# Patient Record
Sex: FEMALE | Race: WHITE | NOT HISPANIC OR LATINO | Employment: STUDENT | URBAN - METROPOLITAN AREA
[De-identification: names, ages, dates, MRNs, and addresses within clinical notes are randomized per-mention and may not be internally consistent; named-entity substitution may affect disease eponyms.]

---
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
## 2020-05-16 ENCOUNTER — E-VISIT (OUTPATIENT)
Dept: FAMILY MEDICINE | Facility: CLINIC | Age: 20
End: 2020-05-16
Payer: COMMERCIAL

## 2020-05-16 DIAGNOSIS — B35.4 TINEA CORPORIS: Primary | ICD-10-CM

## 2020-05-16 PROCEDURE — 99421 OL DIG E/M SVC 5-10 MIN: CPT | Performed by: FAMILY MEDICINE

## 2020-05-19 ENCOUNTER — MYC MEDICAL ADVICE (OUTPATIENT)
Dept: FAMILY MEDICINE | Facility: CLINIC | Age: 20
End: 2020-05-19

## 2020-05-19 RX ORDER — ITRACONAZOLE 100 MG/1
200 CAPSULE ORAL DAILY
Qty: 14 CAPSULE | Refills: 0 | Status: SHIPPED | OUTPATIENT
Start: 2020-05-19 | End: 2020-05-26

## 2020-05-19 RX ORDER — TRIAMCINOLONE ACETONIDE 1 MG/G
CREAM TOPICAL 2 TIMES DAILY
Qty: 30 G | Refills: 1 | Status: SHIPPED | OUTPATIENT
Start: 2020-05-19 | End: 2021-08-15

## 2020-12-06 ENCOUNTER — HEALTH MAINTENANCE LETTER (OUTPATIENT)
Age: 20
End: 2020-12-06

## 2021-08-13 ENCOUNTER — MYC MEDICAL ADVICE (OUTPATIENT)
Dept: FAMILY MEDICINE | Facility: CLINIC | Age: 21
End: 2021-08-13

## 2021-08-13 DIAGNOSIS — B35.4 TINEA CORPORIS: ICD-10-CM

## 2021-08-13 NOTE — TELEPHONE ENCOUNTER
Pending Prescriptions:                       Disp   Refills    triamcinolone (KENALOG) 0.1 % external cre*30 g   1        Sig: Apply topically 2 times daily      Routing refill request to provider for review/approval because:  Patient needs to be seen because it has been more than 1 year since last office visit.    Dayana Prieto RN on 8/13/2021 at 1:44 PM

## 2021-08-15 RX ORDER — TRIAMCINOLONE ACETONIDE 1 MG/G
CREAM TOPICAL 2 TIMES DAILY
Qty: 30 G | Refills: 1 | Status: SHIPPED | OUTPATIENT
Start: 2021-08-15 | End: 2022-10-06

## 2021-09-25 ENCOUNTER — HEALTH MAINTENANCE LETTER (OUTPATIENT)
Age: 21
End: 2021-09-25

## 2022-01-15 ENCOUNTER — HEALTH MAINTENANCE LETTER (OUTPATIENT)
Age: 22
End: 2022-01-15

## 2022-04-05 ENCOUNTER — MYC MEDICAL ADVICE (OUTPATIENT)
Dept: FAMILY MEDICINE | Facility: CLINIC | Age: 22
End: 2022-04-05
Payer: COMMERCIAL

## 2022-04-06 NOTE — TELEPHONE ENCOUNTER
Patient is informed to call and ask where she can get her vaccine.  Closing this encounter.  Jackie Ramos, HAWAN, RN

## 2022-04-12 ENCOUNTER — TELEPHONE (OUTPATIENT)
Dept: FAMILY MEDICINE | Facility: CLINIC | Age: 22
End: 2022-04-12
Payer: COMMERCIAL

## 2022-04-12 NOTE — TELEPHONE ENCOUNTER
If pt only needs a tetanus shot, she can schedule with a float nurse. I have attempted to contact pt to schedule an appt. I left a message for her to return my call. Rosario Dave, CMA

## 2022-04-12 NOTE — TELEPHONE ENCOUNTER
Reason for call:  Other   Patient called regarding (reason for call): appointment  Additional comments:   Patient is registering for school. Her registration is on hold until she gets her Updated tetanus. She is available Friday 4/15 or early Monday 4/18. She can see any available provider if possible. Please call to discuss.    Phone number to reach patient:  Other phone number:    [phone] Marycarmen    Best Time:  any    Can we leave a detailed message on this number?  YES    Travel screening: Not Applicable

## 2022-04-15 ENCOUNTER — ALLIED HEALTH/NURSE VISIT (OUTPATIENT)
Dept: FAMILY MEDICINE | Facility: CLINIC | Age: 22
End: 2022-04-15
Payer: COMMERCIAL

## 2022-04-15 DIAGNOSIS — Z23 NEED FOR VACCINATION: Primary | ICD-10-CM

## 2022-04-15 PROCEDURE — 90714 TD VACC NO PRESV 7 YRS+ IM: CPT

## 2022-04-15 PROCEDURE — 99207 PR NO CHARGE NURSE ONLY: CPT

## 2022-04-15 PROCEDURE — 90471 IMMUNIZATION ADMIN: CPT

## 2022-04-15 NOTE — PROGRESS NOTES
Prior to immunization administration, verified patients identity using patient s name and date of birth. Please see Immunization Activity for additional information.     Screening Questionnaire for Adult Immunization    Are you sick today?   No   Do you have allergies to medications, food, a vaccine component or latex?   No   Have you ever had a serious reaction after receiving a vaccination?   No   Do you have a long-term health problem with heart, lung, kidney, or metabolic disease (e.g., diabetes), asthma, a blood disorder, no spleen, complement component deficiency, a cochlear implant, or a spinal fluid leak?  Are you on long-term aspirin therapy?   No   Do you have cancer, leukemia, HIV/AIDS, or any other immune system problem?   No   Do you have a parent, brother, or sister with an immune system problem?   No   In the past 3 months, have you taken medications that affect  your immune system, such as prednisone, other steroids, or anticancer drugs; drugs for the treatment of rheumatoid arthritis, Crohn s disease, or psoriasis; or have you had radiation treatments?   No   Have you had a seizure, or a brain or other nervous system problem?   No   During the past year, have you received a transfusion of blood or blood    products, or been given immune (gamma) globulin or antiviral drug?   No   For women: Are you pregnant or is there a chance you could become       pregnant during the next month?   No   Have you received any vaccinations in the past 4 weeks?   No     Immunization questionnaire answers were all negative.        Per orders of Dr. Valentino, injection of TD given by Karis Watkins CMA. Patient instructed to remain in clinic for 15 minutes afterwards, and to report any adverse reaction to me immediately.       Screening performed by Karis Watkins CMA on 4/15/2022 at 1:26 PM.

## 2022-10-04 ENCOUNTER — MYC MEDICAL ADVICE (OUTPATIENT)
Dept: FAMILY MEDICINE | Facility: CLINIC | Age: 22
End: 2022-10-04

## 2022-10-05 ENCOUNTER — E-VISIT (OUTPATIENT)
Dept: FAMILY MEDICINE | Facility: CLINIC | Age: 22
End: 2022-10-05
Payer: COMMERCIAL

## 2022-10-05 DIAGNOSIS — B35.4 TINEA CORPORIS: ICD-10-CM

## 2022-10-05 PROCEDURE — 99421 OL DIG E/M SVC 5-10 MIN: CPT | Performed by: FAMILY MEDICINE

## 2022-10-06 RX ORDER — PRENATAL VIT 91/IRON/FOLIC/DHA 28-975-200
COMBINATION PACKAGE (EA) ORAL 2 TIMES DAILY
Qty: 30 G | Refills: 1 | Status: SHIPPED | OUTPATIENT
Start: 2022-10-06 | End: 2023-05-19

## 2022-10-06 RX ORDER — TRIAMCINOLONE ACETONIDE 1 MG/G
CREAM TOPICAL 2 TIMES DAILY
Qty: 30 G | Refills: 1 | Status: SHIPPED | OUTPATIENT
Start: 2022-10-06 | End: 2023-05-19

## 2022-10-06 NOTE — PATIENT INSTRUCTIONS
"  Dear Pati Mahmood    After reviewing your responses, I've been able to diagnose you with \"tinea\" which is a common skin condition caused by a fungal infection. There are many common names for this depending on where it is located and it's appearance (jock itch, athlete's foot, ringworm, etc).  Based on your responses, I have prescribed terbinafine and triamcinolone to treat this. Please follow the instructions on the medication. If you experience irritation of your skin, new rash, or any other new symptoms, you should stop using this medication and contact your primary care provider.  If this treatment does not work for you in 2 weeks or after completing treatment, please plan to follow-up with your primary care provider to evaluate in-person.  Self-care:  Wash all items that come into contact with infected skin: Wash all towels, clothes, and bedding in hot water. Use laundry soap. Clean shower stalls, mats, and floors with a germ-killing or fungus-killing .   Do not share personal items: Do not share towels, brushes, pierce, or hair accessories.    Keep your skin, hair, and nails clean and dry: Bathe every day, and dry your skin before you put medicine on the infected area. Wash your hands often. Do not scratch your sores. This may cause the infection to spread.   Avoid infected pets: A patch of missing fur is a sign of infection in a pet. Take your infected pet to a  for treatment.  Contact your primary healthcare provider if:  You have a fever.    Your infection continues to spread after 7 days of treatment.   Your rash is not gone in 2 weeks.   The area around your rash becomes red, warm, tender, swollen, or smells bad.   You have questions or concerns about your condition or care.    Thanks for choosing?us?as your health care partner,    Lainey Valentino MD  "

## 2023-01-07 ENCOUNTER — HEALTH MAINTENANCE LETTER (OUTPATIENT)
Age: 23
End: 2023-01-07

## 2023-04-22 ENCOUNTER — HEALTH MAINTENANCE LETTER (OUTPATIENT)
Age: 23
End: 2023-04-22

## 2023-05-19 ENCOUNTER — OFFICE VISIT (OUTPATIENT)
Dept: FAMILY MEDICINE | Facility: CLINIC | Age: 23
End: 2023-05-19
Payer: COMMERCIAL

## 2023-05-19 VITALS
HEART RATE: 85 BPM | HEIGHT: 67 IN | TEMPERATURE: 97.7 F | SYSTOLIC BLOOD PRESSURE: 106 MMHG | BODY MASS INDEX: 24.09 KG/M2 | WEIGHT: 153.5 LBS | OXYGEN SATURATION: 99 % | DIASTOLIC BLOOD PRESSURE: 70 MMHG | RESPIRATION RATE: 16 BRPM

## 2023-05-19 DIAGNOSIS — Z00.00 ROUTINE PHYSICAL EXAMINATION: Primary | ICD-10-CM

## 2023-05-19 DIAGNOSIS — B35.4 TINEA CORPORIS: ICD-10-CM

## 2023-05-19 DIAGNOSIS — Z12.4 SCREENING FOR MALIGNANT NEOPLASM OF CERVIX: ICD-10-CM

## 2023-05-19 DIAGNOSIS — Z13.6 CARDIOVASCULAR SCREENING; LDL GOAL LESS THAN 160: ICD-10-CM

## 2023-05-19 LAB
C TRACH DNA SPEC QL NAA+PROBE: NEGATIVE
CHOLEST SERPL-MCNC: 149 MG/DL
HDLC SERPL-MCNC: 61 MG/DL
LDLC SERPL CALC-MCNC: 77 MG/DL
N GONORRHOEA DNA SPEC QL NAA+PROBE: NEGATIVE
NONHDLC SERPL-MCNC: 88 MG/DL
TRIGL SERPL-MCNC: 54 MG/DL

## 2023-05-19 PROCEDURE — 36415 COLL VENOUS BLD VENIPUNCTURE: CPT | Performed by: FAMILY MEDICINE

## 2023-05-19 PROCEDURE — 87591 N.GONORRHOEAE DNA AMP PROB: CPT | Performed by: FAMILY MEDICINE

## 2023-05-19 PROCEDURE — 87491 CHLMYD TRACH DNA AMP PROBE: CPT | Performed by: FAMILY MEDICINE

## 2023-05-19 PROCEDURE — G0145 SCR C/V CYTO,THINLAYER,RESCR: HCPCS | Performed by: FAMILY MEDICINE

## 2023-05-19 PROCEDURE — 80061 LIPID PANEL: CPT | Performed by: FAMILY MEDICINE

## 2023-05-19 PROCEDURE — 99395 PREV VISIT EST AGE 18-39: CPT | Performed by: FAMILY MEDICINE

## 2023-05-19 RX ORDER — PRENATAL VIT 91/IRON/FOLIC/DHA 28-975-200
COMBINATION PACKAGE (EA) ORAL 2 TIMES DAILY
Qty: 60 G | Refills: 1 | Status: SHIPPED | OUTPATIENT
Start: 2023-05-19

## 2023-05-19 RX ORDER — TRIAMCINOLONE ACETONIDE 1 MG/G
CREAM TOPICAL 2 TIMES DAILY
Qty: 60 G | Refills: 1 | Status: SHIPPED | OUTPATIENT
Start: 2023-05-19 | End: 2024-08-15

## 2023-05-19 ASSESSMENT — ENCOUNTER SYMPTOMS
JOINT SWELLING: 0
ARTHRALGIAS: 0
PARESTHESIAS: 0
DYSURIA: 0
SORE THROAT: 0
PALPITATIONS: 0
HEARTBURN: 0
CONSTIPATION: 0
HEMATURIA: 0
WEAKNESS: 0
DIARRHEA: 0
HEADACHES: 0
MYALGIAS: 0
HEMATOCHEZIA: 0
COUGH: 0
DIZZINESS: 0
FREQUENCY: 0
FEVER: 0
ABDOMINAL PAIN: 0
NERVOUS/ANXIOUS: 0
CHILLS: 0
NAUSEA: 0
SHORTNESS OF BREATH: 0
EYE PAIN: 0

## 2023-05-19 ASSESSMENT — PAIN SCALES - GENERAL: PAINLEVEL: NO PAIN (0)

## 2023-05-19 NOTE — PROGRESS NOTES
SUBJECTIVE:   CC: Pati is an 23 year old who presents for preventive health visit.       5/19/2023     7:19 AM   Additional Questions   Roomed by Karis VIVAR   Patient has been advised of split billing requirements and indicates understanding: Yes  Healthy Habits:     Getting at least 3 servings of Calcium per day:  Yes    Bi-annual eye exam:  NO    Dental care twice a year:  Yes    Sleep apnea or symptoms of sleep apnea:  None    Diet:  Regular (no restrictions)    Frequency of exercise:  2-3 days/week    Duration of exercise:  30-45 minutes    Taking medications regularly:  Not Applicable    Medication side effects:  Not applicable    PHQ-2 Total Score: 0    Additional concerns today:  No        Today's PHQ-2 Score:       5/19/2023     7:18 AM   PHQ-2 ( 1999 [employer])   Q1: Little interest or pleasure in doing things 0   Q2: Feeling down, depressed or hopeless 0   PHQ-2 Score 0   Q1: Little interest or pleasure in doing things Not at all   Q2: Feeling down, depressed or hopeless Not at all   PHQ-2 Score 0       Have you ever done Advance Care Planning? (For example, a Health Directive, POLST, or a discussion with a medical provider or your loved ones about your wishes): No, advance care planning information given to patient to review.  Patient declined advance care planning discussion at this time.    Social History     Tobacco Use     Smoking status: Never     Smokeless tobacco: Never   Vaping Use     Vaping status: Some Days   Substance Use Topics     Alcohol use: Yes     Comment: social not heavy             5/19/2023     7:18 AM   Alcohol Use   Prescreen: >3 drinks/day or >7 drinks/week? No     Reviewed orders with patient.  Reviewed health maintenance and updated orders accordingly - Yes  BP Readings from Last 3 Encounters:   05/19/23 106/70   04/06/18 108/64   11/08/16 110/56 (46 %, Z = -0.10 /  13 %, Z = -1.13)*     *BP percentiles are based on the 2017 AAP Clinical Practice Guideline for girls    Wt  Readings from Last 3 Encounters:   05/19/23 69.6 kg (153 lb 8 oz)   04/06/18 65 kg (143 lb 6.4 oz) (75 %, Z= 0.68)*   11/08/16 62.6 kg (138 lb) (74 %, Z= 0.63)*     * Growth percentiles are based on [facility] (Girls, 2-20 Years) data.                  There is no problem list on file for this patient.    Past Surgical History:   Procedure Laterality Date     PE TUBES Bilateral        Social History     Tobacco Use     Smoking status: Never     Smokeless tobacco: Never   Vaping Use     Vaping status: Some Days   Substance Use Topics     Alcohol use: Yes     Comment: social not heavy     Family History   Problem Relation Age of Onset     Heart Disease Maternal Grandfather      Diabetes Maternal Grandfather      Myocardial Infarction Paternal Grandfather            Breast Cancer Screening:        History of abnormal Pap smear: NO - age 21-29 PAP every 3 years recommended     Reviewed and updated as needed this visit by clinical staff   Tobacco  Allergies  Meds     Fam Hx  Soc Hx        Reviewed and updated as needed this visit by Provider   Tobacco       Fam Hx  Soc Hx           Review of Systems   Constitutional: Negative for chills and fever.   HENT: Negative for congestion, ear pain, hearing loss and sore throat.    Eyes: Negative for pain and visual disturbance.   Respiratory: Negative for cough and shortness of breath.    Cardiovascular: Negative for chest pain, palpitations and peripheral edema.   Gastrointestinal: Negative for abdominal pain, constipation, diarrhea, heartburn, hematochezia and nausea.   Genitourinary: Negative for dysuria, frequency, genital sores, hematuria and urgency.   Musculoskeletal: Negative for arthralgias, joint swelling and myalgias.   Skin: Positive for rash.   Neurological: Negative for dizziness, weakness, headaches and paresthesias.   Psychiatric/Behavioral: Negative for mood changes. The patient is not nervous/anxious.      Has a rash on her arms that will not go away. Has had it  "since high school. Has treated for tinea and eczema. Sometimes gets better, the lamisil hasn't helped much at all but the triamcinolone seems to work better.     She will be leaving tomorrow for a job in [place] for the summer.  She will be back in the fall for 1 more semester of school.  She technically already graduated but has a little more coursework to do.    She is in a good stable relationship for several years, is sexually active and using condoms.  Does not desire any other birth control.     OBJECTIVE:   /70   Pulse 85   Temp 97.7  F (36.5  C) (Temporal)   Resp 16   Ht 1.689 m (5' 6.5\")   Wt 69.6 kg (153 lb 8 oz)   LMP 05/05/2023   SpO2 99%   BMI 24.40 kg/m    Physical Exam  GENERAL: healthy, alert and no distress  EYES: Eyes grossly normal to inspection, PERRL and conjunctivae and sclerae normal  HENT: ear canals and TM's normal, nose and mouth without ulcers or lesions  NECK: no adenopathy, no asymmetry, masses, or scars and thyroid normal to palpation  RESP: lungs clear to auscultation - no rales, rhonchi or wheezes  BREAST: normal without masses, tenderness or nipple discharge and no palpable axillary masses or adenopathy  CV: regular rate and rhythm, normal S1 S2, no S3 or S4, no murmur, click or rub, no peripheral edema and peripheral pulses strong  ABDOMEN: soft, nontender, no hepatosplenomegaly, no masses and bowel sounds normal  Vaginal exam reveals normal external and internal genitalia.  Cervix is closed, long and thick.  No lesions or abnormalities seen.  Pap and genprobe collected. Bimanual exam reveals a nontender, nongravid uterus with no CMT.  No adnexal masses or tenderness.     MS: no gross musculoskeletal defects noted, no edema  SKIN: numerous benign pigmented moles on her back. On both arms just at the elbows but laterally, she has a raised crescent shaped red rash, somewhat rolled edge, discreet. On the right arm, roughly 3-4 cm in length, on the left arm more of a " circular rash with raised edges, 50 cent piece sized.   NEURO: Normal strength and tone, mentation intact and speech normal  PSYCH: mentation appears normal, affect normal/bright    Diagnostic Test Results:  Orders Placed This Encounter   Procedures     REVIEW OF HEALTH MAINTENANCE PROTOCOL ORDERS     Lipid panel reflex to direct LDL Fasting        ASSESSMENT/PLAN:       ICD-10-CM    1. Routine physical examination  Z00.00 NEISSERIA GONORRHOEA PCR     CHLAMYDIA TRACHOMATIS PCR      2. Screening for malignant neoplasm of cervix  Z12.4 Pap screen only - recommended age 21 - 24 years      3. Tinea corporis  B35.4 terbinafine (LAMISIL) 1 % external cream     triamcinolone (KENALOG) 0.1 % external cream      4. CARDIOVASCULAR SCREENING; LDL GOAL LESS THAN 160  Z13.6 Lipid panel reflex to direct LDL Fasting          Patient has been advised of split billing requirements and indicates understanding: Yes but NA  Recommend a yearly physical, monthly SBE. Pap every 3 years if normal or per ACOG guidelines.   Screening lipids pending.     I refilled her triamcinolone and lamisil, if not healed with steady use through the summer then would recommend biopsy when she returns.     COUNSELING:  Reviewed preventive health counseling, as reflected in patient instructions  Special attention given to:        Regular exercise       Healthy diet/nutrition       Vision screening       Immunizations    Up to date.            Contraception - all options discussed, plans to stay with condoms. Will let me know if wants something else.        Family planning discussed, she would be ok with pregnancy but not planning at this time.        Safe sex practices/STD prevention  She states she has already had testing for Hep C and HIV, declined further today.     She reports that she has never smoked. She has never used smokeless tobacco.          Lainey Valentino MD  [facility]

## 2023-05-23 LAB
BKR LAB AP GYN ADEQUACY: NORMAL
BKR LAB AP GYN INTERPRETATION: NORMAL
BKR LAB AP HPV REFLEX: NO
BKR LAB AP PREVIOUS ABNORMAL: NORMAL
PATH REPORT.COMMENTS IMP SPEC: NORMAL
PATH REPORT.COMMENTS IMP SPEC: NORMAL
PATH REPORT.RELEVANT HX SPEC: NORMAL

## 2024-06-23 ENCOUNTER — HEALTH MAINTENANCE LETTER (OUTPATIENT)
Age: 24
End: 2024-06-23

## 2024-08-15 ENCOUNTER — MYC REFILL (OUTPATIENT)
Dept: FAMILY MEDICINE | Facility: CLINIC | Age: 24
End: 2024-08-15
Payer: COMMERCIAL

## 2024-08-15 DIAGNOSIS — B35.4 TINEA CORPORIS: ICD-10-CM

## 2024-08-16 RX ORDER — TRIAMCINOLONE ACETONIDE 1 MG/G
CREAM TOPICAL 2 TIMES DAILY
Qty: 60 G | Refills: 1 | Status: SHIPPED | OUTPATIENT
Start: 2024-08-16

## 2024-11-15 ENCOUNTER — PATIENT OUTREACH (OUTPATIENT)
Dept: CARE COORDINATION | Facility: CLINIC | Age: 24
End: 2024-11-15
Payer: COMMERCIAL

## 2025-07-12 ENCOUNTER — HEALTH MAINTENANCE LETTER (OUTPATIENT)
Age: 25
End: 2025-07-12

## 2025-08-11 ENCOUNTER — TRANSFERRED RECORDS (OUTPATIENT)
Dept: HEALTH INFORMATION MANAGEMENT | Facility: CLINIC | Age: 25
End: 2025-08-11
Payer: COMMERCIAL